# Patient Record
Sex: FEMALE | Race: WHITE | NOT HISPANIC OR LATINO | Employment: UNEMPLOYED | URBAN - NONMETROPOLITAN AREA
[De-identification: names, ages, dates, MRNs, and addresses within clinical notes are randomized per-mention and may not be internally consistent; named-entity substitution may affect disease eponyms.]

---

## 2018-11-05 ENCOUNTER — OFFICE VISIT (OUTPATIENT)
Dept: FAMILY MEDICINE CLINIC | Facility: CLINIC | Age: 28
End: 2018-11-05

## 2018-11-05 VITALS
RESPIRATION RATE: 16 BRPM | DIASTOLIC BLOOD PRESSURE: 78 MMHG | HEIGHT: 62 IN | TEMPERATURE: 97.7 F | BODY MASS INDEX: 24.48 KG/M2 | SYSTOLIC BLOOD PRESSURE: 118 MMHG | OXYGEN SATURATION: 98 % | WEIGHT: 133 LBS | HEART RATE: 108 BPM

## 2018-11-05 DIAGNOSIS — Z76.5 DRUG-SEEKING BEHAVIOR: Chronic | ICD-10-CM

## 2018-11-05 DIAGNOSIS — R21 RASH WITHOUT HIVES: ICD-10-CM

## 2018-11-05 DIAGNOSIS — F15.10 METHAMPHETAMINE ABUSE (HCC): Primary | Chronic | ICD-10-CM

## 2018-11-05 PROBLEM — F50.2 BULIMIA NERVOSA: Status: ACTIVE | Noted: 2017-04-07

## 2018-11-05 PROBLEM — R56.9 SEIZURE (HCC): Status: ACTIVE | Noted: 2017-04-06

## 2018-11-05 PROBLEM — T14.91XA SUICIDE ATTEMPT (HCC): Status: ACTIVE | Noted: 2017-04-06

## 2018-11-05 PROCEDURE — 99214 OFFICE O/P EST MOD 30 MIN: CPT | Performed by: NURSE PRACTITIONER

## 2018-11-05 RX ORDER — METHYLPREDNISOLONE ACETATE 80 MG/ML
80 INJECTION, SUSPENSION INTRA-ARTICULAR; INTRALESIONAL; INTRAMUSCULAR; SOFT TISSUE ONCE
Status: DISCONTINUED | OUTPATIENT
Start: 2018-11-05 | End: 2018-11-05

## 2018-11-05 RX ORDER — CLINDAMYCIN HYDROCHLORIDE 300 MG/1
300 CAPSULE ORAL 4 TIMES DAILY
Qty: 40 CAPSULE | Refills: 0 | Status: SHIPPED | OUTPATIENT
Start: 2018-11-05

## 2018-11-05 NOTE — PROGRESS NOTES
"Chief Complaint   Patient presents with   • Rash     Subjective   Cristina Recinos is a 28 y.o. female who presents to the office for a skin rash of the left arm..she presents this as her chief complaint. 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 She has lost her children to their father, she reports traveling far and wide among the states to run from her problems and seek the next man and the next meth high. She requests a \"few Klonopin, or a few Xanax, or a few gabapentin\" and she would \"be just fine then\". She has attempted suicide with overdose of prescription SSRI in the past, by her own admission with a penchant for taking anything which will result in a good high by intravenous route.  She declines a referral to suboxone clinic as she knows she \"would just try to inject them so why bother\". She declines all offers of referral back to [facility] in [place] (which she states is awesome and she would love to back to) by switching to a paranoid line of thought talking about \"the game\" and \"the people who are watching me all the time and talking about me\".  She demonstrates a very dedicated line of thought with paranoid characteristics, she truly feels people are always talking about her, even now, In this exam room. She declines a behavioral health referral or a psychiatric referral. She declines an antipsychotic to stop the voices, because \"they are real people, not imagination!\".  She is agitated and crying and in constant motion.  At last, when I have exhausted every safe option I have for medication and referrals, including 72 hour hold in a psychiatric facility in preparation for transfer to a detox facility/ rehab, she thanks me for my time, and asks me to \"forget the ointment for the rash, it wasn't that important anyway. \"       The following portions of the patient's history were reviewed and updated as appropriate: allergies, current medications, past family history, past medical " "history, past social history, past surgical history and problem list.    History of Present Illness     Past Medical History:   Diagnosis Date   • Anxiety    • Depression    • Hepatitis C           Family History   Problem Relation Age of Onset   • COPD Father         Review of Systems   Constitutional: Negative.  Negative for fever and unexpected weight change.   HENT: Negative.    Eyes: Negative.    Respiratory: Negative.  Negative for cough, chest tightness and shortness of breath.    Cardiovascular: Negative.  Negative for chest pain.   Gastrointestinal: Negative.    Endocrine: Negative.    Genitourinary: Negative.  Negative for dysuria.   Musculoskeletal: Negative.    Skin: Positive for rash. Negative for color change, pallor and wound.        Complains of itchy red patches of rash on her right arm and left buttock.   Allergic/Immunologic: Negative.    Neurological: Negative.    Hematological: Negative.    Psychiatric/Behavioral: Positive for agitation, behavioral problems, decreased concentration, dysphoric mood and hallucinations (voices she denies that these are hallucinations). Negative for sleep disturbance and suicidal ideas. The patient is nervous/anxious and is hyperactive.        Objective   Vitals:    11/05/18 1508   BP: 118/78   Pulse: 108   Resp: 16   Temp: 97.7 °F (36.5 °C)   SpO2: 98%   Weight: 60.3 kg (133 lb)   Height: 157.5 cm (62\")   PainSc: 0-No pain     Physical Exam   Constitutional: She is oriented to person, place, and time. She appears well-developed and well-nourished.   HENT:   Head: Normocephalic and atraumatic.   Eyes: Pupils are equal, round, and reactive to light. Conjunctivae are normal.   Neck: Normal range of motion. Neck supple.   Cardiovascular: Normal rate, regular rhythm, normal heart sounds and intact distal pulses.  Exam reveals no gallop and no friction rub.    No murmur heard.  Pulmonary/Chest: Effort normal and breath sounds normal. No respiratory distress. She has no " "wheezes. She has no rales. She exhibits no tenderness.   Abdominal: Soft. Bowel sounds are normal.   Musculoskeletal: Normal range of motion. She exhibits no edema, tenderness or deformity.   Lymphadenopathy:     She has no cervical adenopathy.   Neurological: She is alert and oriented to person, place, and time.   Skin: Skin is warm and dry. Capillary refill takes 2 to 3 seconds. Rash (scattered round patches of raised flaky skin on an errythematous background in scattered distribution up the underside of left arm from wrist to left axilla. ) noted. No erythema. No pallor.   Psychiatric: Her mood appears anxious. Her affect is inappropriate. Her affect is not angry, not blunt and not labile. Her speech is rapid and/or pressured. Her speech is not delayed, not tangential and not slurred. She is agitated, hyperactive and actively hallucinating. She is not aggressive, not slowed, not withdrawn and not combative. Thought content is paranoid and delusional. Cognition and memory are not impaired. She expresses inappropriate judgment. She does not express impulsivity. She exhibits a depressed mood. She expresses no homicidal and no suicidal ideation. She expresses no suicidal plans and no homicidal plans. She is communicative. She exhibits normal recent memory and normal remote memory.   Hearing voices that arent there, states she has smells emanating from her that are foul and strange and that no doctor every listens about that. She speaks at length about a \"game\" that \"everyone\" is playing on her.  She truly believes that someone is watching her at all times and can hear everything she says. She has social phobia and is very depressed. States meth is the only way she can escape all these strange feelings. Denies SI or HI refuses all offers of referral for behavioral health, psychiatry and inpatient psychiatric facility admission and referral to inpatient rehab facility. I do not believe she is an immediate threat to " "self or others, but her lifestyle is a form of self injury. She is attentive.   Nursing note and vitals reviewed.      Assessment/Plan   Cristina was seen today for rash.    Diagnoses and all orders for this visit:    Methamphetamine abuse ([facility])  Comments:  admits to IV meth abuse current. Previous remission. States she needs a \"fix\" now.    contact dermatitis  Comments:  prescription sent, declines a steroid injection for itch.  Orders:  -     Discontinue: methylPREDNISolone acetate (DEPO-medrol) injection 80 mg; Inject 1 mL into the appropriate muscle as directed by prescriber 1 (One) Time.    Drug-seeking behavior  Comments:  specifically requests Xanax, Klonopin or gabapentin, refuses all other medications for her anxiety/ depression/ paranoid auditory hallucinations    Other orders  -     triamcinolone (KENALOG) 0.1 % ointment; Apply  topically to the appropriate area as directed 2 (Two) Times a Day.  -     clindamycin (CLEOCIN) 300 MG capsule; Take 1 capsule by mouth 4 (Four) Times a Day.           PHQ-2/PHQ-9 Depression Screening 11/5/2018   Little interest or pleasure in doing things 3   Feeling down, depressed, or hopeless 3   Trouble falling or staying asleep, or sleeping too much 3   Feeling tired or having little energy 3   Poor appetite or overeating 3   Feeling bad about yourself - or that you are a failure or have let yourself or your family down 3   Trouble concentrating on things, such as reading the newspaper or watching television 3   Moving or speaking so slowly that other people could have noticed. Or the opposite - being so fidgety or restless that you have been moving around a lot more than usual 3   Thoughts that you would be better off dead, or of hurting yourself in some way 0   Total Score 24   If you checked off any problems, how difficult have these problems made it for you to do your work, take care of things at home, or get along with other people? Extremely dIfficult       Perlita" L Pina, APRN         Return if symptoms worsen or fail to improve, for Annual physical.    Patient Instructions   Patient encouraged to return if she decides she will discuss/ allow referral to behavioral health, psychiatry, or inpatient rehabilitation for substance abuse.     Patient encouraged to return PRN illness.

## 2018-11-05 NOTE — PATIENT INSTRUCTIONS
Patient encouraged to return if she decides she will discuss/ allow referral to behavioral health, psychiatry, or inpatient rehabilitation for substance abuse.     Patient encouraged to return PRN illness.